# Patient Record
Sex: MALE | Race: WHITE | NOT HISPANIC OR LATINO | Employment: OTHER | URBAN - METROPOLITAN AREA
[De-identification: names, ages, dates, MRNs, and addresses within clinical notes are randomized per-mention and may not be internally consistent; named-entity substitution may affect disease eponyms.]

---

## 2023-11-21 ENCOUNTER — EVALUATION (OUTPATIENT)
Dept: PHYSICAL THERAPY | Facility: CLINIC | Age: 53
End: 2023-11-21
Payer: COMMERCIAL

## 2023-11-21 DIAGNOSIS — Z98.890 S/P ACHILLES TENDON REPAIR: Primary | ICD-10-CM

## 2023-11-21 PROCEDURE — 97110 THERAPEUTIC EXERCISES: CPT | Performed by: PHYSICAL THERAPIST

## 2023-11-21 PROCEDURE — 97161 PT EVAL LOW COMPLEX 20 MIN: CPT | Performed by: PHYSICAL THERAPIST

## 2023-11-21 NOTE — LETTER
2023    Phillip Hernandez DPM  [facility]    Patient: Teofilo Zarate [facility]   YOB: 1970   Date of Visit: 2023     Encounter Diagnosis     ICD-10-CM    1. S/P Achilles tendon repair  W66.593           Dear Dr. Kenyon Robles:    Thank you for your recent referral of [facility]. Please review the attached evaluation summary from Monroe's recent visit. Please verify that you agree with the plan of care by signing the attached order. If you have any questions or concerns, please do not hesitate to call. I sincerely appreciate the opportunity to share in the care of one of your patients and hope to have another opportunity to work with you in the near future. Sincerely,    Ottis Brunner, PT      Referring Provider:      I certify that I have read the below Plan of Care and certify the need for these services furnished under this plan of treatment while under my care. Phillip Hernandez DPM  [facility] [address]  Via Fax: [phone]          PT Evaluation     Today's date: 2023  Patient name: Teofilo Zarate [facility]  : 1970  MRN: [id number]  Referring provider: Phillip Hernandez DPM  Dx:   Encounter Diagnosis     ICD-10-CM    1. S/P Achilles tendon repair  Z98.890           Start Time: 8796  Stop Time: 0900  Total time in clinic (min): 45 minutes    Assessment  Assessment details: Monroe SUNSHINE Chavarria [facility] is a pleasant 46 y.o. male presents with R achilles repair. No further referral appears necessary at this time. He currently shows deficits in all planes ankle strength and limited DF ROM. Also lacking appropriate gait mechanics. Educated on HEP to address. Skilled PT services appropriate to facilitate return to prior level of function with transition to home exercise program for independent management when appropriate.     Impairments: abnormal muscle firing, abnormal muscle tone, abnormal or restricted ROM, impaired physical strength, lacks appropriate home exercise program and pain with function  Understanding of Dx/Px/POC: good   Prognosis: good    Goals  Patient will be able to manage symptoms independently  LT weeks  1. pt will reach foto predicted  2. pt will decrease worst pain 75%   ST weeks  1. Pt will decrease worst pain 50%  2. Patient will successfully manage HEP        Plan  Patient would benefit from: skilled PT  Referral necessary: No  Planned modality interventions: thermotherapy: hydrocollator packs  Planned therapy interventions: home exercise program, manual therapy, neuromuscular re-education, patient education, functional ROM exercises, strengthening, stretching, joint mobilization, graded activity, graded exercise, therapeutic exercise, body mechanics training, motor coordination training and activity modification  Frequency: 2x week  Duration in weeks: 12  Treatment plan discussed with: patient        Subjective Evaluation    History of Present Illness  Date of surgery: 10/17/2023  Mechanism of injury: Pt s/p R achilles repair. Cast was removed yesterday and has now been ambulating in a boot. Plan is to continue in the boot for the next month. He currently feels uneasy/unsteady walking with a cane, hoping to improve confidence over the next weeks. Most pain is on bottom and posterior heel as sharp and dull. Pain is only when up and walking on it. Pt is being seen for f/u by physician in about 1 month. Recurrent probem    Patient Goals  Patient goals for therapy: increased strength, increased motion and decreased pain    Pain  Current pain ratin  At best pain ratin  At worst pain ratin  Quality: sharp and dull ache        Objective     Strength/Myotome Testing     Right Ankle/Foot   Dorsiflexion: 4-  Plantar flexion: 3-  Inversion: 3+  Eversion: 3+    General Comments: Ankle/Foot Comments   Gait with cane: significant UE Wbing, poor weight acceptance into R foot. --> improved with cuing and NMRE with mild increased pain      Flowsheet Rows      Flowsheet Row Most Recent Value   PT/OT G-Codes    Current Score 39   Projected Score 63               Precautions: Wbing in boot      Manuals 11/21            Gastroc stretch/STM nv                                                   Neuro Re-Ed             Tband weight shifts reviewed                                                                                          Ther Ex             Tband Eversion reviewed            Tband PF reviewed            Iso ankle inv reviewed            Seated heel raise nv            SLR flexion nv            SLR abd nv            bike nv                         Ther Activity                                       Gait Training                                       Modalities

## 2023-11-21 NOTE — PROGRESS NOTES
PT Evaluation     Today's date: 2023  Patient name: Carey Duong  : 1970  MRN: [id number]  Referring provider: Rae Dejesus DPM  Dx:   Encounter Diagnosis     ICD-10-CM    1. S/P Achilles tendon repair  Z98.890           Start Time: 52  Stop Time: 0900  Total time in clinic (min): 45 minutes    Assessment  Assessment details: Jordan SUNSHINE Duong is a pleasant 46 y.o. male presents with R achilles repair. No further referral appears necessary at this time. He currently shows deficits in all planes ankle strength and limited DF ROM. Also lacking appropriate gait mechanics. Educated on HEP to address. Skilled PT services appropriate to facilitate return to prior level of function with transition to home exercise program for independent management when appropriate. Impairments: abnormal muscle firing, abnormal muscle tone, abnormal or restricted ROM, impaired physical strength, lacks appropriate home exercise program and pain with function  Understanding of Dx/Px/POC: good   Prognosis: good    Goals  Patient will be able to manage symptoms independently  LT weeks  1. pt will reach foto predicted  2. pt will decrease worst pain 75%   ST weeks  1. Pt will decrease worst pain 50%  2.  Patient will successfully manage HEP        Plan  Patient would benefit from: skilled PT  Referral necessary: No  Planned modality interventions: thermotherapy: hydrocollator packs  Planned therapy interventions: home exercise program, manual therapy, neuromuscular re-education, patient education, functional ROM exercises, strengthening, stretching, joint mobilization, graded activity, graded exercise, therapeutic exercise, body mechanics training, motor coordination training and activity modification  Frequency: 2x week  Duration in weeks: 12  Treatment plan discussed with: patient        Subjective Evaluation    History of Present Illness  Date of surgery: 10/17/2023  Mechanism of injury: Pt s/p R achilles repair. Cast was removed yesterday and has now been ambulating in a boot. Plan is to continue in the boot for the next month. He currently feels uneasy/unsteady walking with a cane, hoping to improve confidence over the next weeks. Most pain is on bottom and posterior heel as sharp and dull. Pain is only when up and walking on it. Pt is being seen for f/u by physician in about 1 month. Recurrent probem    Patient Goals  Patient goals for therapy: increased strength, increased motion and decreased pain    Pain  Current pain ratin  At best pain ratin  At worst pain ratin  Quality: sharp and dull ache        Objective     Strength/Myotome Testing     Right Ankle/Foot   Dorsiflexion: 4-  Plantar flexion: 3-  Inversion: 3+  Eversion: 3+    General Comments: Ankle/Foot Comments   Gait with cane: significant UE Wbing, poor weight acceptance into R foot. --> improved with cuing and NMRE with mild increased pain      Flowsheet Rows      Flowsheet Row Most Recent Value   PT/OT G-Codes    Current Score 39   Projected Score 63               Precautions: Wbing in boot      Manuals             Gastroc stretch/STM nv                                                   Neuro Re-Ed             Tband weight shifts reviewed                                                                                          Ther Ex             Tband Eversion reviewed            Tband PF reviewed            Iso ankle inv reviewed            Seated heel raise nv            SLR flexion nv            SLR abd nv            bike nv                         Ther Activity                                       Gait Training                                       Modalities

## 2023-11-21 NOTE — LETTER
2023      No Recipients    Patient: Rinaldo Castleman. Deon Zarate [facility]   YOB: 1970   Date of Visit: 2023     Encounter Diagnosis     ICD-10-CM    1. S/P Achilles tendon repair  E09.744           Dear Dr. Anais Ward:    Thank you for your recent referral of [facility]. Please review the attached evaluation summary from [place]'s recent visit. Please verify that you agree with the plan of care by signing the attached order. If you have any questions or concerns, please do not hesitate to call. I sincerely appreciate the opportunity to share in the care of one of your patients and hope to have another opportunity to work with you in the near future. Sincerely,    Eva Bryson, PT      Referring Provider:      I certify that I have read the below Plan of Care and certify the need for these services furnished under this plan of treatment while under my care. Alem Clark DPM  [facility] [address]  Via Fax: [phone]          PT Evaluation     Today's date: 2023  Patient name: Rinaldo Castleman. Deon Elliot [facility]  : 1970  MRN: [id number]  Referring provider: Alem Clark DPM  Dx:   Encounter Diagnosis     ICD-10-CM    1. S/P Achilles tendon repair  Z98.890           Start Time: 1816  Stop Time: 0900  Total time in clinic (min): 45 minutes    Assessment  Assessment details: [place] SUNSHINE Chavarria [facility] is a pleasant 46 y.o. male presents with R achilles repair. No further referral appears necessary at this time. He currently shows deficits in all planes ankle strength and limited DF ROM. Also lacking appropriate gait mechanics. Educated on HEP to address. Skilled PT services appropriate to facilitate return to prior level of function with transition to home exercise program for independent management when appropriate.     Impairments: abnormal muscle firing, abnormal muscle tone, abnormal or restricted ROM, impaired physical strength, lacks appropriate home exercise program and pain with function  Understanding of Dx/Px/POC: good   Prognosis: good    Goals  Patient will be able to manage symptoms independently  LT weeks  1. pt will reach foto predicted  2. pt will decrease worst pain 75%   ST weeks  1. Pt will decrease worst pain 50%  2. Patient will successfully manage HEP        Plan  Patient would benefit from: skilled PT  Referral necessary: No  Planned modality interventions: thermotherapy: hydrocollator packs  Planned therapy interventions: home exercise program, manual therapy, neuromuscular re-education, patient education, functional ROM exercises, strengthening, stretching, joint mobilization, graded activity, graded exercise, therapeutic exercise, body mechanics training, motor coordination training and activity modification  Frequency: 2x week  Duration in weeks: 12  Treatment plan discussed with: patient        Subjective Evaluation    History of Present Illness  Date of surgery: 10/17/2023  Mechanism of injury: Pt s/p R achilles repair. Cast was removed yesterday and has now been ambulating in a boot. Plan is to continue in the boot for the next month. He currently feels uneasy/unsteady walking with a cane, hoping to improve confidence over the next weeks. Most pain is on bottom and posterior heel as sharp and dull. Pain is only when up and walking on it. Pt is being seen for f/u by physician in about 1 month. Recurrent probem    Patient Goals  Patient goals for therapy: increased strength, increased motion and decreased pain    Pain  Current pain ratin  At best pain ratin  At worst pain ratin  Quality: sharp and dull ache        Objective     Strength/Myotome Testing     Right Ankle/Foot   Dorsiflexion: 4-  Plantar flexion: 3-  Inversion: 3+  Eversion: 3+    General Comments: Ankle/Foot Comments   Gait with cane: significant UE Wbing, poor weight acceptance into R foot. --> improved with cuing and NMRE with mild increased pain      Flowsheet Rows      Flowsheet Row Most Recent Value   PT/OT G-Codes    Current Score 39   Projected Score 63               Precautions: Wbing in boot      Manuals 11/21            Gastroc stretch/STM nv                                                   Neuro Re-Ed             Tband weight shifts reviewed                                                                                          Ther Ex             Tband Eversion reviewed            Tband PF reviewed            Iso ankle inv reviewed            Seated heel raise nv            SLR flexion nv            SLR abd nv            bike nv                         Ther Activity                                       Gait Training                                       Modalities

## 2023-11-28 ENCOUNTER — OFFICE VISIT (OUTPATIENT)
Dept: PHYSICAL THERAPY | Facility: CLINIC | Age: 53
End: 2023-11-28
Payer: COMMERCIAL

## 2023-11-28 DIAGNOSIS — Z98.890 S/P ACHILLES TENDON REPAIR: Primary | ICD-10-CM

## 2023-11-28 PROCEDURE — 97112 NEUROMUSCULAR REEDUCATION: CPT

## 2023-11-28 PROCEDURE — 97110 THERAPEUTIC EXERCISES: CPT

## 2023-11-28 PROCEDURE — 97140 MANUAL THERAPY 1/> REGIONS: CPT

## 2023-11-28 NOTE — PROGRESS NOTES
Daily Note     Today's date: 2023  Patient name: Pushpa Cárdenas  : 1970  MRN: [id number]  Referring provider: Cinthia Maloney DPM  Dx:   Encounter Diagnosis     ICD-10-CM    1. S/P Achilles tendon repair  [id number]                      Subjective: Patient presents today ambulating without boot and SPC. Pt states he is wearing boot for longer distances mostly. Pt reports some soreness with HEP but improving. Objective: See treatment diary below. Assessment: Tolerated treatment well. Good tolerance to progression of exercise reporting minimal muscle soreness/fatigue post treatment. Verbal/tactile cues provided with exercise for proper technique. Gait improved with SPC. Mild edema right ankle. Incision site closed and dry. Cautioned patient regarding boot and MD orders for protected [address] [place], [place] [address]. Minimal difficulty with hip abduction reporting hip soreness. Patient exhibited good technique with therapeutic exercises and would benefit from continued PT      Plan: Continue per plan of care.       Precautions: Wbing in boot      Manuals            Gastroc stretch/STM nv GH                                                  Neuro Re-Ed             Tband weight shifts reviewed                                                                                          Ther Ex             Tband Eversion reviewed BTB In/ev 5"x10 each           Tband PF reviewed BTB 5"x10           Iso ankle inv reviewed 5"x10           Seated heel raise nv 5"x10           SLR flexion nv 5"x10           SLR abd nv 5"x5           bike nv 5 min L1                        Ther Activity                                       Gait Training                                       Modalities

## 2023-12-08 ENCOUNTER — OFFICE VISIT (OUTPATIENT)
Dept: PHYSICAL THERAPY | Facility: CLINIC | Age: 53
End: 2023-12-08
Payer: COMMERCIAL

## 2023-12-08 DIAGNOSIS — Z98.890 S/P ACHILLES TENDON REPAIR: Primary | ICD-10-CM

## 2023-12-08 PROCEDURE — 97110 THERAPEUTIC EXERCISES: CPT | Performed by: PHYSICAL THERAPIST

## 2023-12-08 PROCEDURE — 97140 MANUAL THERAPY 1/> REGIONS: CPT | Performed by: PHYSICAL THERAPIST

## 2023-12-08 NOTE — PROGRESS NOTES
Daily Note     Today's date: 2023  Patient name: Tammy Philip  : 1970  MRN: [id number]  Referring provider: María Rios DPM  Dx:   Encounter Diagnosis     ICD-10-CM    1. S/P Achilles tendon repair  [id number]                      Subjective: Pt notes strength is improving well and he mostly feels stiff in the calf. Objective: See treatment diary below      Assessment: Tolerated treatment well. Patient would benefit from continued PT. Focused on calf mobility today. Plan: Continue per plan of care.       Precautions: Wbing in boot      Manuals           Gastroc stretch/STM nv GH CB                                                 Neuro Re-Ed             Tband weight shifts reviewed                                                                                          Ther Ex             Tband Eversion reviewed BTB In/ev 5"x10 each CB          Tband PF reviewed BTB 5"x10 CB          Iso ankle inv reviewed 5"x10 CB          Seated heel raise nv 5"x10 NV          SLR flexion nv 5"x10 NV          SLR abd nv 5"x5 NV          bike nv 5 min L1 CB                       Ther Activity                                       Gait Training                                       Modalities

## 2023-12-12 ENCOUNTER — OFFICE VISIT (OUTPATIENT)
Dept: PHYSICAL THERAPY | Facility: CLINIC | Age: 53
End: 2023-12-12
Payer: COMMERCIAL

## 2023-12-12 DIAGNOSIS — Z98.890 S/P ACHILLES TENDON REPAIR: Primary | ICD-10-CM

## 2023-12-12 PROCEDURE — 97140 MANUAL THERAPY 1/> REGIONS: CPT | Performed by: PHYSICAL THERAPIST

## 2023-12-12 PROCEDURE — 97110 THERAPEUTIC EXERCISES: CPT | Performed by: PHYSICAL THERAPIST

## 2023-12-12 PROCEDURE — 97112 NEUROMUSCULAR REEDUCATION: CPT | Performed by: PHYSICAL THERAPIST

## 2023-12-12 NOTE — PROGRESS NOTES
Daily Note     Today's date: 2023  Patient name: Ary Barron. Meri Case  : 1970  MRN: [id number]  Referring provider: Amanda Araiza DPM  Dx:   Encounter Diagnosis     ICD-10-CM    1. S/P Achilles tendon repair  [id number]                      Subjective: Pt reports some improvement in stiffness of the leg. Objective: See treatment diary below      Assessment: Tolerated treatment well. Patient would benefit from continued PT. Continue to mobilize gastroc with strengthening as tolerated. Plan: Continue per plan of care.       Precautions: Wbing in boot      Manuals          Gastroc stretch/STM nv GH CB CB                                                Neuro Re-Ed             Tband weight shifts reviewed                                                                                          Ther Ex             Tband Eversion reviewed BTB In/ev 5"x10 each CB CB         Tband PF reviewed BTB 5"x10 CB CB         Iso ankle inv reviewed 5"x10 CB CB         Seated heel raise nv 5"x10 NV 2x10          SLR flexion nv 5"x10 NV nv         SLR abd nv 5"x5 NV nv         bike nv 5 min L1 CB 7' L1                      Ther Activity                                       Gait Training                                       Modalities

## 2023-12-15 ENCOUNTER — OFFICE VISIT (OUTPATIENT)
Dept: PHYSICAL THERAPY | Facility: CLINIC | Age: 53
End: 2023-12-15
Payer: COMMERCIAL

## 2023-12-15 DIAGNOSIS — Z98.890 S/P ACHILLES TENDON REPAIR: Primary | ICD-10-CM

## 2023-12-15 PROCEDURE — 97110 THERAPEUTIC EXERCISES: CPT | Performed by: PHYSICAL THERAPIST

## 2023-12-15 PROCEDURE — 97140 MANUAL THERAPY 1/> REGIONS: CPT | Performed by: PHYSICAL THERAPIST

## 2023-12-15 NOTE — PROGRESS NOTES
Daily Note     Today's date: 12/15/2023  Patient name: Erica Butler  : 1970  MRN: [id number]  Referring provider: Rupal Dunham DPM  Dx:   Encounter Diagnosis     ICD-10-CM    1. S/P Achilles tendon repair  [id number]                      Subjective: Pt reports some increased soreness in the gastroc recently. Overall, thinks are going well. Objective: See treatment diary below      Assessment: Tolerated treatment well. Patient would benefit from continued PT. Continued gastroc and achilles restriction. Plan: Continue per plan of care. Precautions: Wbing in boot      Manuals 11/21 11/28 12/8 12/12 12/15        Gastroc stretch/STM nv GH CB CB CB                                               Neuro Re-Ed             Tband weight shifts reviewed                                                                                          Ther Ex             Tband Eversion reviewed BTB In/ev 5"x10 each CB CB CB        Tband PF reviewed BTB 5"x10 CB CB CB        Iso ankle inv reviewed 5"x10 CB CB CB        Seated heel raise nv 5"x10 NV 2x10  CB        SLR flexion nv 5"x10 NV nv         SLR abd nv 5"x5 NV nv         bike nv 5 min L1 CB 7' L1 CB        Standing soleus stretch     10x10:         Ther Activity                                       Gait Training                                       Modalities
admission

## 2023-12-19 ENCOUNTER — OFFICE VISIT (OUTPATIENT)
Dept: PHYSICAL THERAPY | Facility: CLINIC | Age: 53
End: 2023-12-19
Payer: COMMERCIAL

## 2023-12-19 DIAGNOSIS — Z98.890 S/P ACHILLES TENDON REPAIR: Primary | ICD-10-CM

## 2023-12-19 PROCEDURE — 97112 NEUROMUSCULAR REEDUCATION: CPT | Performed by: PHYSICAL THERAPIST

## 2023-12-19 PROCEDURE — 97140 MANUAL THERAPY 1/> REGIONS: CPT | Performed by: PHYSICAL THERAPIST

## 2023-12-19 PROCEDURE — 97110 THERAPEUTIC EXERCISES: CPT | Performed by: PHYSICAL THERAPIST

## 2023-12-19 NOTE — PROGRESS NOTES
"Daily Note     Today's date: 2023  Patient name: [place] SUNSHINE Valdez  : 1970  MRN: [id number]  Referring provider: Donato Linder DPM  Dx:   Encounter Diagnosis     ICD-10-CM    1. S/P Achilles tendon repair  Z98.890                      Subjective: Pt reports decreased pain in the achilles recently.       Objective: See treatment diary below      Assessment: Tolerated treatment well. Patient would benefit from continued PT.      Plan: Continue per plan of care.      Precautions: Wbing in boot      Manuals 11/21 11/28 12/8 12/12 12/15 12/19       Gastroc stretch/STM nv GH CB CB CB CB                                              Neuro Re-Ed             Tband weight shifts reviewed            VG DL squats      X20 35%       VG DL heel raise      X20 35%                                                           Ther Ex             Tband Eversion reviewed BTB In/ev 5\"x10 each CB CB CB CB       Tband PF reviewed BTB 5\"x10 CB CB CB CB       Iso ankle inv reviewed 5\"x10 CB CB CB CB       Seated heel raise nv 5\"x10 NV 2x10  CB CB       SLR flexion nv 5\"x10 NV nv         SLR abd nv 5\"x5 NV nv         bike nv 5 min L1 CB 7' L1 CB CB       Standing soleus stretch     10x10: CB       Ther Activity                                       Gait Training                                       Modalities                                                    "

## 2023-12-22 ENCOUNTER — OFFICE VISIT (OUTPATIENT)
Dept: PHYSICAL THERAPY | Facility: CLINIC | Age: 53
End: 2023-12-22
Payer: COMMERCIAL

## 2023-12-22 DIAGNOSIS — Z98.890 S/P ACHILLES TENDON REPAIR: Primary | ICD-10-CM

## 2023-12-22 PROCEDURE — 97140 MANUAL THERAPY 1/> REGIONS: CPT | Performed by: PHYSICAL THERAPIST

## 2023-12-22 PROCEDURE — 97110 THERAPEUTIC EXERCISES: CPT | Performed by: PHYSICAL THERAPIST
